# Patient Record
Sex: FEMALE | Employment: UNEMPLOYED | URBAN - METROPOLITAN AREA
[De-identification: names, ages, dates, MRNs, and addresses within clinical notes are randomized per-mention and may not be internally consistent; named-entity substitution may affect disease eponyms.]

---

## 2020-01-01 ENCOUNTER — DOCUMENTATION ONLY (OUTPATIENT)
Dept: INFUSION THERAPY | Facility: HOSPITAL | Age: 65
End: 2020-01-01

## 2020-01-01 ENCOUNTER — TELEPHONE (OUTPATIENT)
Dept: HEPATOLOGY | Facility: CLINIC | Age: 65
End: 2020-01-01

## 2020-01-01 ENCOUNTER — LAB VISIT (OUTPATIENT)
Dept: INFUSION THERAPY | Facility: HOSPITAL | Age: 65
End: 2020-01-01
Attending: INTERNAL MEDICINE
Payer: MEDICAID

## 2020-01-01 DIAGNOSIS — Z03.818 ENCNTR FOR OBS FOR SUSP EXPSR TO OTH BIOLG AGENTS RULED OUT: ICD-10-CM

## 2020-01-01 LAB — SARS-COV-2 RNA RESP QL NAA+PROBE: NOT DETECTED

## 2020-01-01 PROCEDURE — U0002 COVID-19 LAB TEST NON-CDC: HCPCS

## 2020-03-19 NOTE — TELEPHONE ENCOUNTER
Dr. Aguila Roberts ordered that patient be scheduled for hep c consult.  Per staff they have no records pertaining to patient's hep c diagnosis.   I spoke with patient who stated that provider attempted to treat her for virus years ago but would not do because she was using Marijuana actively.  She is going to go through some old paper work and see if she can find hep c lab results and states that she will call us back.  She did not want to setup a video visit.  Patient scheduled to see PA Scheuermann on 5/26/20; appt notice mailed.

## 2020-05-06 PROBLEM — C22.0 HEPATOCELLULAR CARCINOMA: Status: ACTIVE | Noted: 2020-01-01

## 2020-05-11 NOTE — PROGRESS NOTES
Pharmacy Treatment Plan Review    Patient  Christie Cast, 65 y.o.  1955    Indication: Hepatoocellular cancer     Labs:  CBC  No results found for: WBC, HGB, HCT, MCV, PLT    BMP  No results found for: NA, K, CL, CO2, BUN, CREATININE, CALCIUM, ANIONGAP, ESTGFRAFRICA, EGFRNONAA    LFTs  No results found for: ALT, AST, GGT, ALKPHOS, BILITOT    The patient is scheduled to start the following infusion:   OP HEPATOCELLULAR NIVOLUMAB Q2W     14-day cycle for 6 cycles of:    Chemotherapy:   -nivolumab 240 mg in sodium chloride 0.9% 124 mL infusion, 240 mg, Intravenous, Clinic/HOD 1 time, on day 1    The treatment plan matches N[facility]N template

## 2020-05-12 NOTE — PROGRESS NOTES
"Nutrition: Called and spoke to patient today regarding the nutrition portion of new patient education. Patient is currently a severe nutritional risk due to poor appetite. States everything she eats her "meal" goes right through. States she can not swallow whole foods. Patient consumes liquids only. Patient consumes mostly soups, broth, tea, and water. Wt: Patient did not know how much she weighed and she knows she recently lost a lot of weight but uncertain of the number. Discussed the need to consume adequate calories. Discussed foods for patient to try. Discussed ONS. Discussed smoothies. Discussed the importance of small frequent meals. Discussed eating by watch and not by hunger que. Offered support and encouragement. Will follow up with patient once she begins treatment. Lilo Marie, MS, RD, LDN  "

## 2020-05-12 NOTE — PROGRESS NOTES
[2:03 PM] [email or web address]      Segun MRN #: [id number] approved for Financial Assistance. Can you schedule Op[facility] Asap?   ?  ?[2:13 PM] Amara Reyes      i can do 5/15 at 130pm   just make sure the patient has their covid testing thanks  ?[2:16 PM] [email or web address]      Will do. I call patient.   ?[2:16 PM] Amara Reyes      thanks   ??[3:50 PM] [email or web address]      Segun #[id number] agreed to 05/15 at 1:30pm  ?[3:52 PM] Amara phelps

## 2020-05-15 PROBLEM — K74.60 CIRRHOSIS: Status: ACTIVE | Noted: 2020-01-01

## 2020-05-15 PROBLEM — R53.81 DEBILITY: Status: ACTIVE | Noted: 2020-01-01

## 2020-05-15 PROBLEM — Z71.89 ACP (ADVANCE CARE PLANNING): Status: ACTIVE | Noted: 2020-01-01

## 2020-05-15 PROBLEM — N17.9 ACUTE KIDNEY FAILURE: Status: ACTIVE | Noted: 2020-01-01

## 2020-05-15 PROBLEM — I10 HYPERTENSION: Status: ACTIVE | Noted: 2020-01-01

## 2020-05-15 PROBLEM — E11.9 DM (DIABETES MELLITUS): Status: ACTIVE | Noted: 2020-01-01

## 2020-05-20 NOTE — TELEPHONE ENCOUNTER
Pt being referred to HCV clinic by Aguila Roberts MD. Phoned pt to inform her of rescheduling of 5/26 appointment with PA Scheuermann. Spoke with pt's , he stated pt is currently in hospice and will no longer need consult with HCV clinic.